# Patient Record
Sex: MALE | Race: WHITE | NOT HISPANIC OR LATINO | URBAN - METROPOLITAN AREA
[De-identification: names, ages, dates, MRNs, and addresses within clinical notes are randomized per-mention and may not be internally consistent; named-entity substitution may affect disease eponyms.]

---

## 2018-08-17 ENCOUNTER — EMERGENCY (EMERGENCY)
Facility: HOSPITAL | Age: 33
LOS: 0 days | Discharge: HOME | End: 2018-08-17
Admitting: EMERGENCY MEDICINE

## 2018-08-17 VITALS
RESPIRATION RATE: 16 BRPM | SYSTOLIC BLOOD PRESSURE: 120 MMHG | TEMPERATURE: 97 F | HEART RATE: 65 BPM | OXYGEN SATURATION: 98 % | DIASTOLIC BLOOD PRESSURE: 62 MMHG
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
## 2018-08-17 DIAGNOSIS — M25.579 PAIN IN UNSPECIFIED ANKLE AND JOINTS OF UNSPECIFIED FOOT: ICD-10-CM

## 2018-08-17 DIAGNOSIS — S93.601A UNSPECIFIED SPRAIN OF RIGHT FOOT, INITIAL ENCOUNTER: ICD-10-CM
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
## 2018-08-17 NOTE — ED POST DISCHARGE NOTE - DETAILS
left message with mother of patient to call back ED SPOKE WITH PATIENT AT CELL PHONE- [phone]. INFORMED OF FOOT BONE FX. HE WILL CALL FOR APPT. TODAY WITH PODIATRY OR ORTHOPEDICS.

## 2018-08-17 NOTE — ED PROVIDER NOTE - NS ED ROS FT
Constitutional: (-) fever  Skin: (-) bleeding  Muskuloskeletal: (+) right foot pain  Neurological: (-) altered mental status

## 2018-08-17 NOTE — ED PROVIDER NOTE - PHYSICAL EXAMINATION
Physical Exam    Vital Signs: I have reviewed the initial vital signs.  Constitutional: well-nourished, appears stated age, no acute distress  Skin: warm, dry  Muskuloskeletal: RLE: tender to palpation lateral aspect of proximal foot by 5th MT. No ecchymosis. NT to palpation or ROM ankle. n/v intact. NT to palpation over right fibular head,   Neuro: AOx3, Motor 5/5, Sensation: equal and intact Physical Exam    Vital Signs: I have reviewed the initial vital signs.  Constitutional: well-nourished, appears stated age, no acute distress  Skin: warm, dry  Muskuloskeletal: RLE: tender to palpation lateral aspect of proximal foot by 5th MT. No ecchymosis. NT to palpation of ankle or with ROM ankle. n/v intact. NT to palpation over right fibular head,   Neuro: AOx3, Motor 5/5, Sensation: equal and intact

## 2019-08-01 PROBLEM — Z00.00 ENCOUNTER FOR PREVENTIVE HEALTH EXAMINATION: Status: ACTIVE | Noted: 2019-08-01

## 2020-06-22 NOTE — ED ADULT NURSE NOTE - CHPI ED NUR DURATION
Instructions: This plan will send the code FBSE to the PM system.  DO NOT or CHANGE the price. Detail Level: Simple Price (Do Not Change): 0.00 today

## 2024-05-22 ENCOUNTER — NON-APPOINTMENT (OUTPATIENT)
Age: 39
End: 2024-05-22

## 2025-05-05 ENCOUNTER — NON-APPOINTMENT (OUTPATIENT)
Age: 40
End: 2025-05-05

## 2025-06-19 ENCOUNTER — NON-APPOINTMENT (OUTPATIENT)
Age: 40
End: 2025-06-19